# Patient Record
Sex: MALE | Race: WHITE | NOT HISPANIC OR LATINO
[De-identification: names, ages, dates, MRNs, and addresses within clinical notes are randomized per-mention and may not be internally consistent; named-entity substitution may affect disease eponyms.]

---

## 2021-07-12 PROBLEM — Z00.00 ENCOUNTER FOR PREVENTIVE HEALTH EXAMINATION: Status: ACTIVE | Noted: 2021-07-12

## 2021-07-29 ENCOUNTER — APPOINTMENT (OUTPATIENT)
Dept: UROLOGY | Facility: CLINIC | Age: 51
End: 2021-07-29
Payer: MEDICARE

## 2021-07-29 VITALS — HEIGHT: 69 IN | BODY MASS INDEX: 27.85 KG/M2 | TEMPERATURE: 98 F | WEIGHT: 188 LBS

## 2021-07-29 DIAGNOSIS — E27.8 OTHER SPECIFIED DISORDERS OF ADRENAL GLAND: ICD-10-CM

## 2021-07-29 DIAGNOSIS — R68.82 DECREASED LIBIDO: ICD-10-CM

## 2021-07-29 PROCEDURE — 99204 OFFICE O/P NEW MOD 45 MIN: CPT

## 2021-07-29 NOTE — ASSESSMENT
[FreeTextEntry1] : 50 year old with Adrenal Nodule, Stable since 2017. \par PSA is 1.0 which is normal, and stable according to patient prior PSA results.\par No urinary complaints or ED complaints. \par Does complain of decrease libido. \par \par Plan\par -Metanephrine, Aldosterone, and Cortisol ordered to assess Adrenal Nodule\par -Free and Total Testosterone to assess libido\par -Follow up 2 weeks to review.

## 2021-07-29 NOTE — ADDENDUM
[FreeTextEntry1] : Documented by VERNA Gonzalez acting as a scribe for Dr. Edgard Morrison \par \par All medical record entries made by the Scribe were at my, Dr. Morrison direction and\par personally dictated by me.  I have reviewed the chart and agree that the record\par accurately reflects my personal performance of the history, physical exam, procedure and imaging.  \par  \par \par

## 2021-07-29 NOTE — PHYSICAL EXAM
[General Appearance - In No Acute Distress] : no acute distress [] : no respiratory distress [Normal Station and Gait] : the gait and station were normal for the patient's age [No Focal Deficits] : no focal deficits [Oriented To Time, Place, And Person] : oriented to person, place, and time

## 2021-07-29 NOTE — HISTORY OF PRESENT ILLNESS
[FreeTextEntry1] : UVALDO GROSS is a 50 year year old presenting for a evaluation of adrenal nodule and general urology. \par Patient has a past medical history of Hemochromatosis and sleep apnea \par \par Urination symptoms: Patient reports weakening of urinary stream. Denies nocturia, dysuria, and gross hematuria. Patient does not require treatment at this time. \par IPSS: 2 mild symptoms \par \par Erections: Patient report very low libido since April. Patient states that he is attributing this to stress. Patient states that he had no issues getting or maintaining an erection. Patient reports no changes in energy nor weight gain. \par IIEF: 22/25 \par \par Prostate Cancer Screening: PSA 06/2021 was 1.0 ng/mL Patient reports PSA in past has been 1.0 ng/mL. Patient has a family history of prostate cancer. \par \par Old Records:\par MRI of abdomen wo contrast 07/21/2021\par -Leiomyoma measurements, the right adrenal nodule is stable compared to the patients prior MR examination of 07/2018. Signal intesities do not indicate lipid rich adenoma. Given stability this may well represent lipid poor adenoma. (CT in 2017 measured 1.6 x 1.5 x 1.6 cm.) Size was over estimated on recent sonogram. \par \par US abdomen 07/11/2021\par -2.2cm r adrenal nodule. \par \par Primary Care Doctor: Dr. Sonny Garcia \par

## 2021-07-29 NOTE — LETTER BODY
[Dear  ___] : Dear  [unfilled], [Consult Letter:] : I had the pleasure of evaluating your patient, [unfilled]. [Please see my note below.] : Please see my note below. [Sincerely,] : Sincerely, [FreeTextEntry3] : Edgard Morrison MD, FACS\par

## 2021-09-03 ENCOUNTER — APPOINTMENT (OUTPATIENT)
Dept: UROLOGY | Facility: CLINIC | Age: 51
End: 2021-09-03
Payer: MEDICARE

## 2021-09-03 PROCEDURE — 99214 OFFICE O/P EST MOD 30 MIN: CPT | Mod: 95

## 2021-09-06 NOTE — PHYSICAL EXAM
[General Appearance - Well Developed] : well developed [General Appearance - Well Nourished] : well nourished [Heart Rate And Rhythm] : Heart rate and rhythm were normal [] : no respiratory distress [Oriented To Time, Place, And Person] : oriented to person, place, and time

## 2021-09-06 NOTE — HISTORY OF PRESENT ILLNESS
[Home] : at home, [unfilled] , at the time of the visit. [Medical Office: ([facility])___] : at the medical office located in  [Verbal consent obtained from patient] : the patient, [unfilled] [FreeTextEntry1] : UVALDO GROSS is a 50 year year old presenting for a evaluation of adrenal nodule and general urology. \par Patient has a past medical history of Hemochromatosis and sleep apnea \par \par Urination symptoms: Patient reports weakening of urinary stream. Denies nocturia, dysuria, and gross hematuria. Patient does not require treatment at this time. \par IPSS: 2 mild symptoms \par \par Erections: Patient report very low libido since April. Patient states that he is attributing this to stress. Patient states that he had no issues getting or maintaining an erection. Patient reports no changes in energy nor weight gain. \par IIEF: 22/25 \par \par Prostate Cancer Screening: PSA 06/2021 was 1.0 ng/mL Patient reports PSA in past has been 1.0 ng/mL. Patient has a family history of prostate cancer. \par \par Old Records:\par MRI of abdomen wo contrast 07/21/2021\par -Leiomyoma measurements, the right adrenal nodule is stable compared to the patients prior MR examination of 07/2018. Signal intesities do not indicate lipid rich adenoma. Given stability this may well represent lipid poor adenoma. (CT in 2017 measured 1.6 x 1.5 x 1.6 cm.) Size was over estimated on recent sonogram. \par \par US abdomen 07/11/2021\par -2.2cm r adrenal nodule. \par \par serum metanephrines and cortisol wnl\par test - low normal levels

## 2021-09-06 NOTE — ASSESSMENT
[FreeTextEntry1] : 49 yo with adrenal mass- non functional\par discussed low normal T \par explained the role of stress, excess weight, sleep apnea on T levels\par \par weight loss reinforced\par \par -- will repeat imaging ([place])\par - repeat Test in 6 months\par - all questions answered

## 2021-09-06 NOTE — REVIEW OF SYSTEMS
[Chills] : no chills [Fever] : no fever [Chest Pain] : no chest pain [Shortness Of Breath] : no shortness of breath [Abdominal Pain] : no abdominal pain [see HPI] : see HPI [Vomiting] : no vomiting

## 2021-09-17 ENCOUNTER — TRANSCRIPTION ENCOUNTER (OUTPATIENT)
Age: 51
End: 2021-09-17

## 2022-08-03 ENCOUNTER — APPOINTMENT (OUTPATIENT)
Dept: ORTHOPEDIC SURGERY | Facility: CLINIC | Age: 52
End: 2022-08-03

## 2022-08-03 VITALS — BODY MASS INDEX: 27.85 KG/M2 | HEIGHT: 69 IN | WEIGHT: 188 LBS

## 2022-08-03 PROCEDURE — 99213 OFFICE O/P EST LOW 20 MIN: CPT

## 2022-08-03 NOTE — ASSESSMENT
[FreeTextEntry1] : medications refilled monitor right shoulder case he needs an injection he remains totally disabled unable work see\par him in 4 months Follow-up with pain management

## 2022-08-03 NOTE — REASON FOR VISIT
[FreeTextEntry2] : follow up for neck and back pain\par Saw pain management Dr. Naqvi recently did some trigger point injections both sides of his low back still taking Mobic Flexeril gabapentin he did lose 10 lb shoulders are feeling better since last exam particularly the left 1 with the injection which helped MRI of the shoulder had been done  11/22/2021

## 2022-08-03 NOTE — HISTORY OF PRESENT ILLNESS
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 Naqvi are temporary\par had some issues with his left non dominant shoulder lifting a lamp 04/15/2021 now when he raises the arm it hurts he\par has had some problems in the past\par Pain is gotten markedly worse had a cortisone shot helped a little bit did not think therapy helped did get MRI done\par shoulder back in therapy a little discomfort in the right shoulder left shoulder still been stable with the injection right\par shoulder pain is present but does not think it needs a shot today back pain is worse he does have visit with Dr. 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 allergy to penicillin currently on gabapentin tramadol Mobic and Flexeril. he\par was recently told to stop the Mobic due to his GERD he also takes supplements retired in 2014 driving gives him a\par problem does have sleep apnea describes the pain principally going down the right leg he he has had chiropractic\par treatment and physical therapy he does wear a lumbar support he reports spasm his endurance is 15 minutes sitting\par MRIs have been done of the neck and back identifying several herniated discs he has had EMG is of the lower\par extremities in 2018 noting a right S1 and left L5 radiculopathy upper extremity EMGs in 2017 noted right carpal tunnel\par syndrome he had a injection in the cervical spine last September which helped and is still giving him some effective relief\par recently in January had lumbar trigger point injections January 9th which were better than the experience from a lumbar\par epidural in 2016 he is 190 lb when the back acts up laying down is helpful symptoms have been about the same since\par his last visit

## 2022-08-03 NOTE — IMAGING
[de-identified] :  good motion both shoulders minimal impingement\par  cervical spine some spasm neck and shoulders some tightness stiffness limited motion in rotation\par  lumbar stiff tight tight hamstrings negative cough and sneeze effect normal gait

## 2022-08-29 ENCOUNTER — APPOINTMENT (OUTPATIENT)
Dept: UROLOGY | Facility: CLINIC | Age: 52
End: 2022-08-29

## 2022-08-29 VITALS
WEIGHT: 193 LBS | DIASTOLIC BLOOD PRESSURE: 81 MMHG | HEIGHT: 69 IN | SYSTOLIC BLOOD PRESSURE: 119 MMHG | BODY MASS INDEX: 28.58 KG/M2 | HEART RATE: 70 BPM

## 2022-08-29 PROCEDURE — 99214 OFFICE O/P EST MOD 30 MIN: CPT

## 2022-08-31 NOTE — HISTORY OF PRESENT ILLNESS
[FreeTextEntry1] : 51 year year old presenting for follow up - as before he has a stable adrenal nodule and low testosterone\par \par Erections: Patient report very low libido since April. Patient states that he is attributing this to stress. Patient states that he had no issues getting or maintaining an erection. Patient reports no changes in energy nor weight gain. \par IIEF: 22/25 \par \par Prostate Cancer Screening: PSA 06/2021 was 1.0 ng/mL \par Patient reports PSA in past has been 1.0 ng/mL. \par \par Abdomen US 8/10/2022\par \par 1.5 x 1.3 x 1.5 cm corresponding to the nodule seen on MRI\par \par MRI of abdomen wo contrast 07/21/2021\par -Leiomyoma measurements, the right adrenal nodule is stable compared to the patients prior MR examination of 07/2018. Signal intesities do not indicate lipid rich adenoma. Given stability this may well represent lipid poor adenoma. (CT in 2017 measured 1.6 x 1.5 x 1.6 cm.) Size was over estimated on recent sonogram. \par \par US abdomen 07/11/2021\par -2.2cm r adrenal nodule. \par \par Primary Care Doctor: Dr. Sonny Garcia \par  [Urinary Frequency] : urinary frequency [Weak Stream] : weak stream

## 2022-08-31 NOTE — ASSESSMENT
[FreeTextEntry1] : 51 year old with Adrenal Nodule, Stable since 2017. \par PSA is 1.0 which is normal, and stable according to patient prior PSA results.\par No urinary complaints or ED complaints. \par Does complain of decrease libido. \par \par Plan\par -Metanephrine, Aldosterone, and Cortisol from 8/2021 - reviewed \par -Free and Total Testosterone, Estradiol, SHBG, FSH, LH, Prolactin \par -Follow up 6 weeks to review

## 2022-10-27 ENCOUNTER — APPOINTMENT (OUTPATIENT)
Dept: UROLOGY | Facility: CLINIC | Age: 52
End: 2022-10-27

## 2022-10-27 PROCEDURE — 99214 OFFICE O/P EST MOD 30 MIN: CPT

## 2022-10-31 NOTE — HISTORY OF PRESENT ILLNESS
[FreeTextEntry1] : 51 year year old presenting for follow up, here to review urine and blood work\par \par 10/8/2022\par \par UA - no RBCs\par nl - FSH, LH, Prolactin, Estradiol, SHBG\par PSA 1.4 with 29% free\par Test - 334 / 64 (nl/nl)\par \par  - as before he has a stable adrenal nodule and (low / normal) total testosterone but now with normal hypothalamic / gonadal parameters and free Test \par \par he has complained of very low libido since April. Patient states that he is attributing this to stress. Patient states that he had no issues getting or maintaining an erection. Patient reports no changes in energy nor weight gain. \par \par Abdomen US 8/10/2022\par \par 1.5 x 1.3 x 1.5 cm corresponding to the nodule seen on MRI\par \par MRI of abdomen wo contrast 07/21/2021\par -Leiomyoma measurements, the right adrenal nodule is stable compared to the patients prior MR examination of 07/2018. Signal intesities do not indicate lipid rich adenoma. Given stability this may well represent lipid poor adenoma. (CT in 2017 measured 1.6 x 1.5 x 1.6 cm.) Size was over estimated on recent sonogram. \par \par US abdomen 07/11/2021\par -2.2cm r adrenal nodule. \par \par Primary Care Doctor: Dr. Sonny Garcia \par  [Urinary Frequency] : urinary frequency [Weak Stream] : weak stream

## 2022-10-31 NOTE — ASSESSMENT
[FreeTextEntry1] : 51 year old with Adrenal Nodule, Stable since 2017. \par PSA is 1.4 which is normal, and stable according to patient prior PSA results. - but should be repeated yearly to ensure stability\par \par - test levels reviewed\par - PSA reviewed\par - no indication for further intervention\par - all questions answered\par

## 2022-12-01 ENCOUNTER — APPOINTMENT (OUTPATIENT)
Dept: ORTHOPEDIC SURGERY | Facility: CLINIC | Age: 52
End: 2022-12-01

## 2022-12-01 PROCEDURE — 99213 OFFICE O/P EST LOW 20 MIN: CPT

## 2022-12-01 RX ORDER — MELOXICAM 15 MG/1
15 TABLET ORAL
Qty: 30 | Refills: 2 | Status: ACTIVE | COMMUNITY
Start: 2022-12-01 | End: 1900-01-01

## 2022-12-02 NOTE — REASON FOR VISIT
[FreeTextEntry2] : follow up for neck and back pain  shoulders feeling better since his injection pain management and injection left foot recently which helped was told he has gout although does not have an elevated uric acid

## 2022-12-02 NOTE — HISTORY OF PRESENT ILLNESS
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 Naqvi are temporary\par had some issues with his left non dominant shoulder lifting a lamp 04/15/2021 now when he raises the arm it hurts he\par has had some problems in the past\par Pain is gotten markedly worse had a cortisone shot helped a little bit did not think therapy helped did get MRI done\par shoulder back in therapy a little discomfort in the right shoulder left shoulder still been stable with the injection right\par shoulder pain is present but does not think it needs a shot today back pain is worse he does have visit with Dr. 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 allergy to penicillin currently on gabapentin tramadol Mobic and Flexeril. he\par was recently told to stop the Mobic due to his GERD he also takes supplements retired in 2014 driving gives him a\par problem does have sleep apnea describes the pain principally going down the right leg he he has had chiropractic\par treatment and physical therapy he does wear a lumbar support he reports spasm his endurance is 15 minutes sitting\par MRIs have been done of the neck and back identifying several herniated discs he has had EMG is of the lower\par extremities in 2018 noting a right S1 and left L5 radiculopathy upper extremity EMGs in 2017 noted right carpal tunnel\par syndrome he had a injection in the cervical spine last September which helped and is still giving him some effective relief\par recently in January had lumbar trigger point injections January 9th which were better than the experience from a lumbar\par epidural in 2016 he is 190 lb when the back acts up laying down is helpful symptoms have been about the same since\par his last visit

## 2022-12-02 NOTE — IMAGING
[de-identified] :  good motion both shoulders minimal impingement\par  cervical spine some spasm neck and shoulders some tightness stiffness limited motion in rotation\par  lumbar stiff tight tight hamstrings negative cough and sneeze effect normal gait

## 2022-12-02 NOTE — ASSESSMENT
[FreeTextEntry1] :  orthopedically stable follow-up with pain management I saw no reason to repeat injections in his shoulders is meloxicam was refilled had enough Flexeril  he remains totally disabled unable to work I will see him back in 4 months

## 2023-02-27 ENCOUNTER — EMERGENCY (EMERGENCY)
Facility: HOSPITAL | Age: 53
LOS: 0 days | Discharge: HOME | End: 2023-02-27
Attending: EMERGENCY MEDICINE
Payer: MEDICARE

## 2023-02-27 VITALS
TEMPERATURE: 99 F | DIASTOLIC BLOOD PRESSURE: 71 MMHG | HEART RATE: 92 BPM | OXYGEN SATURATION: 97 % | RESPIRATION RATE: 18 BRPM | SYSTOLIC BLOOD PRESSURE: 117 MMHG

## 2023-02-27 VITALS
HEART RATE: 88 BPM | HEIGHT: 64 IN | RESPIRATION RATE: 18 BRPM | TEMPERATURE: 99 F | OXYGEN SATURATION: 99 % | SYSTOLIC BLOOD PRESSURE: 108 MMHG | DIASTOLIC BLOOD PRESSURE: 71 MMHG | WEIGHT: 190.04 LBS

## 2023-02-27 DIAGNOSIS — Z88.0 ALLERGY STATUS TO PENICILLIN: ICD-10-CM

## 2023-02-27 DIAGNOSIS — U07.1 COVID-19: ICD-10-CM

## 2023-02-27 DIAGNOSIS — R53.81 OTHER MALAISE: ICD-10-CM

## 2023-02-27 DIAGNOSIS — R11.2 NAUSEA WITH VOMITING, UNSPECIFIED: ICD-10-CM

## 2023-02-27 DIAGNOSIS — R55 SYNCOPE AND COLLAPSE: ICD-10-CM

## 2023-02-27 DIAGNOSIS — R53.1 WEAKNESS: ICD-10-CM

## 2023-02-27 DIAGNOSIS — R50.9 FEVER, UNSPECIFIED: ICD-10-CM

## 2023-02-27 DIAGNOSIS — J45.909 UNSPECIFIED ASTHMA, UNCOMPLICATED: ICD-10-CM

## 2023-02-27 LAB
ALBUMIN SERPL ELPH-MCNC: 4.2 G/DL — SIGNIFICANT CHANGE UP (ref 3.5–5.2)
ALP SERPL-CCNC: 47 U/L — SIGNIFICANT CHANGE UP (ref 30–115)
ALT FLD-CCNC: 24 U/L — SIGNIFICANT CHANGE UP (ref 0–41)
ANION GAP SERPL CALC-SCNC: 12 MMOL/L — SIGNIFICANT CHANGE UP (ref 7–14)
AST SERPL-CCNC: 30 U/L — SIGNIFICANT CHANGE UP (ref 0–41)
BASOPHILS # BLD AUTO: 0.02 K/UL — SIGNIFICANT CHANGE UP (ref 0–0.2)
BASOPHILS NFR BLD AUTO: 0.1 % — SIGNIFICANT CHANGE UP (ref 0–1)
BILIRUB SERPL-MCNC: 0.7 MG/DL — SIGNIFICANT CHANGE UP (ref 0.2–1.2)
BUN SERPL-MCNC: 11 MG/DL — SIGNIFICANT CHANGE UP (ref 10–20)
CALCIUM SERPL-MCNC: 8.9 MG/DL — SIGNIFICANT CHANGE UP (ref 8.4–10.5)
CHLORIDE SERPL-SCNC: 104 MMOL/L — SIGNIFICANT CHANGE UP (ref 98–110)
CO2 SERPL-SCNC: 25 MMOL/L — SIGNIFICANT CHANGE UP (ref 17–32)
CREAT SERPL-MCNC: 1.2 MG/DL — SIGNIFICANT CHANGE UP (ref 0.7–1.5)
EGFR: 72 ML/MIN/1.73M2 — SIGNIFICANT CHANGE UP
EOSINOPHIL # BLD AUTO: 0 K/UL — SIGNIFICANT CHANGE UP (ref 0–0.7)
EOSINOPHIL NFR BLD AUTO: 0 % — SIGNIFICANT CHANGE UP (ref 0–8)
FLUAV AG NPH QL: SIGNIFICANT CHANGE UP
FLUBV AG NPH QL: SIGNIFICANT CHANGE UP
GLUCOSE SERPL-MCNC: 139 MG/DL — HIGH (ref 70–99)
HCT VFR BLD CALC: 43.2 % — SIGNIFICANT CHANGE UP (ref 42–52)
HGB BLD-MCNC: 14.5 G/DL — SIGNIFICANT CHANGE UP (ref 14–18)
IMM GRANULOCYTES NFR BLD AUTO: 0.5 % — HIGH (ref 0.1–0.3)
LYMPHOCYTES # BLD AUTO: 0.96 K/UL — LOW (ref 1.2–3.4)
LYMPHOCYTES # BLD AUTO: 6.2 % — LOW (ref 20.5–51.1)
MCHC RBC-ENTMCNC: 30.5 PG — SIGNIFICANT CHANGE UP (ref 27–31)
MCHC RBC-ENTMCNC: 33.6 G/DL — SIGNIFICANT CHANGE UP (ref 32–37)
MCV RBC AUTO: 90.9 FL — SIGNIFICANT CHANGE UP (ref 80–94)
MONOCYTES # BLD AUTO: 1.36 K/UL — HIGH (ref 0.1–0.6)
MONOCYTES NFR BLD AUTO: 8.8 % — SIGNIFICANT CHANGE UP (ref 1.7–9.3)
NEUTROPHILS # BLD AUTO: 12.96 K/UL — HIGH (ref 1.4–6.5)
NEUTROPHILS NFR BLD AUTO: 84.4 % — HIGH (ref 42.2–75.2)
NRBC # BLD: 0 /100 WBCS — SIGNIFICANT CHANGE UP (ref 0–0)
PLATELET # BLD AUTO: 312 K/UL — SIGNIFICANT CHANGE UP (ref 130–400)
POTASSIUM SERPL-MCNC: 4.2 MMOL/L — SIGNIFICANT CHANGE UP (ref 3.5–5)
POTASSIUM SERPL-SCNC: 4.2 MMOL/L — SIGNIFICANT CHANGE UP (ref 3.5–5)
PROT SERPL-MCNC: 6.8 G/DL — SIGNIFICANT CHANGE UP (ref 6–8)
RBC # BLD: 4.75 M/UL — SIGNIFICANT CHANGE UP (ref 4.7–6.1)
RBC # FLD: 13.6 % — SIGNIFICANT CHANGE UP (ref 11.5–14.5)
RSV RNA NPH QL NAA+NON-PROBE: SIGNIFICANT CHANGE UP
SARS-COV-2 RNA SPEC QL NAA+PROBE: DETECTED
SODIUM SERPL-SCNC: 141 MMOL/L — SIGNIFICANT CHANGE UP (ref 135–146)
WBC # BLD: 15.38 K/UL — HIGH (ref 4.8–10.8)
WBC # FLD AUTO: 15.38 K/UL — HIGH (ref 4.8–10.8)

## 2023-02-27 PROCEDURE — 71045 X-RAY EXAM CHEST 1 VIEW: CPT

## 2023-02-27 PROCEDURE — 71045 X-RAY EXAM CHEST 1 VIEW: CPT | Mod: 26

## 2023-02-27 PROCEDURE — 0241U: CPT

## 2023-02-27 PROCEDURE — 99284 EMERGENCY DEPT VISIT MOD MDM: CPT | Mod: CS

## 2023-02-27 PROCEDURE — 80053 COMPREHEN METABOLIC PANEL: CPT

## 2023-02-27 PROCEDURE — 93005 ELECTROCARDIOGRAM TRACING: CPT

## 2023-02-27 PROCEDURE — 93010 ELECTROCARDIOGRAM REPORT: CPT

## 2023-02-27 PROCEDURE — 94640 AIRWAY INHALATION TREATMENT: CPT

## 2023-02-27 PROCEDURE — 99285 EMERGENCY DEPT VISIT HI MDM: CPT | Mod: 25

## 2023-02-27 PROCEDURE — 36415 COLL VENOUS BLD VENIPUNCTURE: CPT

## 2023-02-27 PROCEDURE — 85025 COMPLETE CBC W/AUTO DIFF WBC: CPT

## 2023-02-27 RX ORDER — IPRATROPIUM/ALBUTEROL SULFATE 18-103MCG
3 AEROSOL WITH ADAPTER (GRAM) INHALATION ONCE
Refills: 0 | Status: COMPLETED | OUTPATIENT
Start: 2023-02-27 | End: 2023-02-27

## 2023-02-27 RX ORDER — SODIUM CHLORIDE 9 MG/ML
1000 INJECTION INTRAMUSCULAR; INTRAVENOUS; SUBCUTANEOUS ONCE
Refills: 0 | Status: COMPLETED | OUTPATIENT
Start: 2023-02-27 | End: 2023-02-27

## 2023-02-27 RX ADMIN — SODIUM CHLORIDE 2000 MILLILITER(S): 9 INJECTION INTRAMUSCULAR; INTRAVENOUS; SUBCUTANEOUS at 11:54

## 2023-02-27 RX ADMIN — Medication 3 MILLILITER(S): at 12:48

## 2023-02-27 NOTE — ED PROVIDER NOTE - ATTENDING APP SHARED VISIT CONTRIBUTION OF CARE
52-year-old male past medical history of asthma presents with presyncopal episode.  Patient states for last few days he has been having viral illness.  Reports fevers, sore throat, cough.  Fever increased to 102 so went to urgent care center and was diagnosed with COVID.  Patient reports that after finding out diagnosis he started to feel lightheaded and felt like he might pass out.  Denies any syncopal episode.  Also reports an episode of nausea and vomiting.  No chest pain palpitations or shortness of breath.  No similar episodes in the past.  Urgent care also reported that his oxygen saturation was low around 95%.  Patient is normal saturation currently.  Received 300 cc of fluids by EMS and 8 mg of Zofran by EMS.    CONSTITUTIONAL: Well-developed; well-nourished; in no acute distress.   SKIN: warm, dry  HEAD: Normocephalic; atraumatic.  EYES: PERRL, EOMI, no conjunctival erythema  ENT: No nasal discharge; airway clear.  NECK: Supple; non tender.  CARD: S1, S2 normal;  Regular rate and rhythm.   RESP: No wheezes, rales or rhonchi.  ABD: soft non tender, non distended, no rebound or guarding  EXT: Normal ROM.  5/5 strength in all 4 extremities   LYMPH: No acute cervical adenopathy.  NEURO: Alert, oriented, grossly unremarkable. neurovascularly intact  PSYCH: Cooperative, appropriate.

## 2023-02-27 NOTE — ED PROVIDER NOTE - PROGRESS NOTE DETAILS
Observed patient ambulating while on pulse oximeter, SpO2 was 97% while ambulating, patient states he is feeling well, normal gait. supervised care of this patient      patient sat is 99 at rest and 97 ambulating . will dc

## 2023-02-27 NOTE — ED PROVIDER NOTE - PATIENT PORTAL LINK FT
You can access the FollowMyHealth Patient Portal offered by [facility] by registering at the following website: [email or web address]. By joining [employer]’s FollowMyHealth portal, you will also be able to view your health information using other applications (apps) compatible with our system.

## 2023-02-27 NOTE — ED PROVIDER NOTE - NS ED ROS FT
Constitutional: (+) fever, (-) chills  Eyes: (-) visual changes  ENT: (-) nasal congestion  Cardiovascular: (-) chest pain, (-) syncope  Respiratory: (+) cough, (-) shortness of breath, (-) dyspnea,   Gastrointestinal: (+) vomiting, (-) diarrhea, (+)nausea,  Musculoskeletal: (-) neck pain, (-) back pain, (-) joint pain,  Integumentary: (-) rash, (-) edema, (-) bruises  Neurological: (-) headache, (-) loc, (-) dizziness, (-) tingling, (-)numbness,  Peripheral Vascular: (-) leg swelling  :  (-)dysuria,  (-) hematuria  Allergic/Immunologic: (-) pruritus

## 2023-02-27 NOTE — ED PROVIDER NOTE - OBJECTIVE STATEMENT
52 year-old male with past medical history asthma presents with complaint of weakness/malaise. States he was feeling unwell this past weekend, went to [facility] today and 52 year-old male with past medical history asthma presents with complaint of weakness/malaise. States he was feeling unwell this past weekend with low grade fever, went to [facility] today and was told he is COVID-positive. Patient states he has never had COVID before, felt anxious at hearing the result, felt clammy and sweaty and felt like he might lose consciousness.  States he felt nauseous and had episode of nonbloody emesis.  EMTs were called, gave patient 300 cc of fluid, 8 mg Zofran, which patient states made him feel better.  Denies trauma, LOC, head trauma, visual changes.  Denies headache, chest pain, shortness of breath, abdominal pain, diarrhea, urinary sx.

## 2023-02-27 NOTE — ED PROVIDER NOTE - CLINICAL SUMMARY MEDICAL DECISION MAKING FREE TEXT BOX
Patient tested positive for COVID at urgent care center.  Found to have saturation 95 at urgent care and episode of presyncope.  So sent to the ED for further evaluation.  Labs EKG chest x-ray done in the ED.  Saturation 98% and higher.  Well-appearing no acute findings.  Discharged with PMD follow-up and return precautions.

## 2023-02-27 NOTE — ED PROVIDER NOTE - NSFOLLOWUPINSTRUCTIONS_ED_ALL_ED_FT
Follow-up with your PCP.    If you are sick with COVID-19 or suspect you are infected with the virus that causes COVID-19, follow the steps below to help prevent the disease from spreading to people in your home and community.   https://www.cdc.gov/coronavirus/2019-ncov/downloads/sick-with-2019-nCoV-fact-sheet.pdf    Stay home except to get medical care   You should restrict activities outside your home, except for getting medical care. Do not go to work, school, or public areas. Avoid using public transportation, ride-sharing, or taxis.   Separate yourself from other people and animals in your home   People: As much as possible, you should stay in a specific room and away from other people in your home. Also, you should use a separate bathroom, if available.   Animals: Do not handle pets or other animals while sick. See COVID-19 and Animals for more information.   Call ahead before visiting your doctor   If you have a medical appointment, call the healthcare provider and tell them that you have or may have COVID-19. This will help the healthcare provider’s office take steps to keep other people from getting infected or exposed.   Wear a facemask   You should wear a facemask when you are around other people (e.g., sharing a room or vehicle) or pets and before you enter a healthcare provider’s office. If you are not able to wear a facemask (for example, because it causes trouble breathing), then people who live with you should not stay in the same room with you, or they should wear a facemask if they enter your room.   Cover your coughs and sneezes   Cover your mouth and nose with a tissue when you cough or sneeze. Throw used tissues in a lined trash can; immediately wash your hands with soap and water for at least 20 seconds or clean your hands with an alcohol-based hand  that contains at least 60% alcohol covering all surfaces of your hands and rubbing them together until they feel dry. Soap and water should be used preferentially if hands are visibly dirty.   Avoid sharing personal household items   You should not share dishes, drinking glasses, cups, eating utensils, towels, or bedding with other people or pets in your home. After using these items, they should be washed thoroughly with soap and water.   Clean your hands often   Wash your hands often with soap and water for at least 20 seconds. If soap and water are not available, clean your hands with an alcohol-based hand  that contains at least 60% alcohol, covering all surfaces of your hands and rubbing them together until they feel dry. Soap and water should be used preferentially if hands are visibly dirty. Avoid touching your eyes, nose, and mouth with unwashed hands.   Clean all “high-touch” surfaces every day   High touch surfaces include counters, tabletops, doorknobs, bathroom fixtures, toilets, phones, keyboards, tablets, and bedside tables. Also, clean any surfaces that may have blood, stool, or body fluids on them. Use a household cleaning spray or wipe, according to the label instructions. Labels contain instructions for safe and effective use of the cleaning product including precautions you should take when applying the product, such as wearing gloves and making sure you have good ventilation during use of the product.   Monitor your symptoms   Seek prompt medical attention if your illness is worsening (e.g., difficulty breathing). Before seeking care, call your healthcare provider and tell them that you have, or are being evaluated for, COVID-19. Put on a facemask before you enter the facility. These steps will help the healthcare provider’s office to keep other people in the office or waiting room from getting infected or exposed. Ask your healthcare provider to call the local or state health department. Persons who are placed under active monitoring or facilitated self-monitoring should follow instructions provided by their local health department or occupational health professionals, as appropriate. When working with your local health department check their available hours. If you have a medical emergency and need to call 911, notify the dispatch personnel that you have, or are being evaluated for COVID-19. If possible, put on a facemask before emergency medical services arrive.   Discontinuing home isolation   Patients with confirmed COVID-19 should remain under home isolation precautions until the risk of secondary transmission to others is thought to be low. The decision to discontinue home isolation precautions should be made on a case-by-case basis, in consultation with healthcare providers and state and local health departments.  For more information: www.cdc.gov/COVID19

## 2023-02-27 NOTE — ED PROVIDER NOTE - PHYSICAL EXAMINATION
Physical Exam    Vital Signs: I have reviewed the initial vital signs.  Constitutional: appears stated age, no acute distress  Eyes: Conjunctiva pink, Sclera clear, PERRLA, EOMI.  ENT: OP is clear without exudates, normal dentition, normal gingival, tongue without swelling  Cardiovascular: S1 and S2, regular rate, regular rhythm, well-perfused extremities, radial pulses equal and 2+, pedal pulses 2+ and equal  Respiratory: unlabored respiratory effort, mild wheeze at right lung base.  Gastrointestinal: soft, non-tender abdomen, no pulsatile mass, normal bowl sounds  Musculoskeletal: supple neck, no lower extremity edema, no midline tenderness  Integumentary: warm, dry, no rash  Neurologic: Awake, alert, oriented. CN II-XII intact, 5/5 strength at upper and lower extremities, no pronator drift, intact finger to nose, steady gait and tandem gait, clear speech  Psychiatric: appropriate mood, appropriate affect

## 2023-03-09 ENCOUNTER — APPOINTMENT (OUTPATIENT)
Dept: ORTHOPEDIC SURGERY | Facility: CLINIC | Age: 53
End: 2023-03-09

## 2023-03-30 ENCOUNTER — APPOINTMENT (OUTPATIENT)
Dept: ORTHOPEDIC SURGERY | Facility: CLINIC | Age: 53
End: 2023-03-30
Payer: MEDICARE

## 2023-03-30 PROBLEM — J45.909 UNSPECIFIED ASTHMA, UNCOMPLICATED: Chronic | Status: ACTIVE | Noted: 2023-02-27

## 2023-03-30 PROCEDURE — 99213 OFFICE O/P EST LOW 20 MIN: CPT

## 2023-03-30 NOTE — HISTORY OF PRESENT ILLNESS
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 Naqvi are temporary\par had some issues with his left non dominant shoulder lifting a lamp 04/15/2021 now when he raises the arm it hurts he\par has had some problems in the past\par Pain is gotten markedly worse had a cortisone shot helped a little bit did not think therapy helped did get MRI done\par shoulder back in therapy a little discomfort in the right shoulder left shoulder still been stable with the injection right\par shoulder pain is present but does not think it needs a shot today back pain is worse he does have visit with Dr. 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 allergy to penicillin currently on gabapentin tramadol Mobic and Flexeril. he\par was recently told to stop the Mobic due to his GERD he also takes supplements retired in 2014 driving gives him a\par problem does have sleep apnea describes the pain principally going down the right leg he he has had chiropractic\par treatment and physical therapy he does wear a lumbar support he reports spasm his endurance is 15 minutes sitting\par MRIs have been done of the neck and back identifying several herniated discs he has had EMG is of the lower\par extremities in 2018 noting a right S1 and left L5 radiculopathy upper extremity EMGs in 2017 noted right carpal tunnel\par syndrome he had a injection in the cervical spine last September which helped and is still giving him some effective relief\par recently in January had lumbar trigger point injections January 9th which were better than the experience from a lumbar\par epidural in 2016 he is 190 lb when the back acts up laying down is helpful symptoms have been about the same since\par his last visit

## 2023-03-30 NOTE — IMAGING
[de-identified] :  good motion both shoulders minimal impingement\par  cervical spine some spasm neck and shoulders some tightness stiffness limited motion in rotation\par  lumbar stiff tight tight hamstrings negative cough and sneeze effect normal gait

## 2023-03-30 NOTE — ASSESSMENT
[FreeTextEntry1] :  orthopedically stable follow-up with pain management I saw no reason to repeat injections in his shoulders  has enough Mobic Flexeril  refilled he remains totally disabled unable to work I will see him back in 4 months\par  sent for a uric acid level

## 2023-06-29 ENCOUNTER — APPOINTMENT (OUTPATIENT)
Dept: ORTHOPEDIC SURGERY | Facility: CLINIC | Age: 53
End: 2023-06-29
Payer: MEDICARE

## 2023-06-29 PROCEDURE — 99213 OFFICE O/P EST LOW 20 MIN: CPT

## 2023-06-29 NOTE — ASSESSMENT
[FreeTextEntry1] :  orthopedically stable follow-up with pain management I saw no reason to repeat injections in his shoulders  has enough Mobic Flexeril  refilled he remains totally disabled unable to work I will see him back in 4 months\par

## 2023-06-29 NOTE — IMAGING
[de-identified] :  good motion both shoulders minimal impingement\par  cervical spine some spasm neck and shoulders some tightness stiffness limited motion in rotation\par  lumbar stiff tight tight hamstrings negative cough and sneeze effect normal gait

## 2023-06-29 NOTE — HISTORY OF PRESENT ILLNESS
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 Naqvi are temporary\par had some issues with his left non dominant shoulder lifting a lamp 04/15/2021 now when he raises the arm it hurts he\par has had some problems in the past\par Pain is gotten markedly worse had a cortisone shot helped a little bit did not think therapy helped did get MRI done\par shoulder back in therapy a little discomfort in the right shoulder left shoulder still been stable with the injection right\par shoulder pain is present but does not think it needs a shot today back pain is worse he does have visit with Dr. 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 allergy to penicillin currently on gabapentin tramadol Mobic and Flexeril. he\par was recently told to stop the Mobic due to his GERD he also takes supplements retired in 2014 driving gives him a\par problem does have sleep apnea describes the pain principally going down the right leg he he has had chiropractic\par treatment and physical therapy he does wear a lumbar support he reports spasm his endurance is 15 minutes sitting\par MRIs have been done of the neck and back identifying several herniated discs he has had EMG is of the lower\par extremities in 2018 noting a right S1 and left L5 radiculopathy upper extremity EMGs in 2017 noted right carpal tunnel\par syndrome he had a injection in the cervical spine last September which helped and is still giving him some effective relief\par recently in January had lumbar trigger point injections January 9th which were better than the experience from a lumbar\par epidural in 2016 he is 190 lb when the back acts up laying down is helpful symptoms have been about the same since\par his last visit

## 2023-06-29 NOTE — REASON FOR VISIT
[FreeTextEntry2] : back and neck pain  Lost 5 lb using Mobic regularly flexor on occasion back is worse when he stands less pain when he is laying down uric acid been up on allopurinol pain is improved in his left great toe

## 2023-11-06 ENCOUNTER — APPOINTMENT (OUTPATIENT)
Dept: ORTHOPEDIC SURGERY | Facility: CLINIC | Age: 53
End: 2023-11-06
Payer: MEDICARE

## 2023-11-06 DIAGNOSIS — M54.12 RADICULOPATHY, CERVICAL REGION: ICD-10-CM

## 2023-11-06 PROCEDURE — 99213 OFFICE O/P EST LOW 20 MIN: CPT

## 2024-02-12 ENCOUNTER — RX RENEWAL (OUTPATIENT)
Age: 54
End: 2024-02-12

## 2024-03-04 ENCOUNTER — APPOINTMENT (OUTPATIENT)
Dept: ORTHOPEDIC SURGERY | Facility: CLINIC | Age: 54
End: 2024-03-04
Payer: MEDICARE

## 2024-03-04 DIAGNOSIS — M75.41 IMPINGEMENT SYNDROME OF RIGHT SHOULDER: ICD-10-CM

## 2024-03-04 DIAGNOSIS — M54.16 RADICULOPATHY, LUMBAR REGION: ICD-10-CM

## 2024-03-04 DIAGNOSIS — M51.9 UNSPECIFIED THORACIC, THORACOLUMBAR AND LUMBOSACRAL INTERVERTEBRAL DISC DISORDER: ICD-10-CM

## 2024-03-04 DIAGNOSIS — M75.42 IMPINGEMENT SYNDROME OF LEFT SHOULDER: ICD-10-CM

## 2024-03-04 DIAGNOSIS — M50.90 CERVICAL DISC DISORDER, UNSPECIFIED, UNSPECIFIED CERVICAL REGION: ICD-10-CM

## 2024-03-04 PROCEDURE — 99213 OFFICE O/P EST LOW 20 MIN: CPT

## 2024-03-04 RX ORDER — CYCLOBENZAPRINE HYDROCHLORIDE 10 MG/1
10 TABLET, FILM COATED ORAL 3 TIMES DAILY
Qty: 90 | Refills: 1 | Status: ACTIVE | COMMUNITY
Start: 2023-03-30 | End: 1900-01-01

## 2024-03-04 NOTE — IMAGING
[de-identified] :  good motion both shoulders minimal impingement\par   cervical spine some spasm neck and shoulders some tightness stiffness limited motion in rotation\par   lumbar stiff tight tight hamstrings negative cough and sneeze effect normal gait

## 2024-03-04 NOTE — REASON FOR VISIT
[FreeTextEntry2] : Patient is coming in for a follow up on lumbar spine.  Still seeing Dr. Naqvi weight is stable 190 endurance sitting or standing 10 to 20 minutes shoulder injection has helped on the left does not feel he needs it again had a flareup of gout which resolved with some prednisone

## 2024-03-04 NOTE — ASSESSMENT
[FreeTextEntry1] : orthopedically stable continue follow-up with pain management I saw no reason to repeat any injections in his shoulders has enough Mobic     Flexeril was refilled he remains totally disabled unable to work I will see him back in 4 months .

## 2024-03-04 NOTE — HISTORY OF PRESENT ILLNESS
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 Naqvi are temporary\par  had some issues with his left non dominant shoulder lifting a lamp 04/15/2021 now when he raises the arm it hurts he\par  has had some problems in the past\par  Pain is gotten markedly worse had a cortisone shot helped a little bit did not think therapy helped did get MRI done\par  shoulder back in therapy a little discomfort in the right shoulder left shoulder still been stable with the injection right\par  shoulder pain is present but does not think it needs a shot today back pain is worse he does have visit with Dr. 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 allergy to penicillin currently on gabapentin tramadol Mobic and Flexeril. 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\par  problem does have sleep apnea describes the pain principally going down the right leg he he has had chiropractic\par  treatment and physical therapy he does wear a lumbar support he reports spasm his endurance is 15 minutes sitting\par  MRIs have been done of the neck and back identifying several herniated discs he has had EMG is of the lower\par  extremities in 2018 noting a right S1 and left L5 radiculopathy upper extremity EMGs in 2017 noted right carpal tunnel\par  syndrome he had a injection in the cervical spine last September which helped and is still giving him some effective relief\par  recently in January had lumbar trigger point injections January 9th which were better than the experience from a lumbar\par  epidural in 2016 he is 190 lb when the back acts up laying down is helpful symptoms have been about the same since\par  his last visit

## 2024-05-02 ENCOUNTER — APPOINTMENT (OUTPATIENT)
Dept: UROLOGY | Facility: CLINIC | Age: 54
End: 2024-05-02
Payer: MEDICARE

## 2024-05-02 VITALS
HEIGHT: 69 IN | TEMPERATURE: 98.4 F | BODY MASS INDEX: 28.58 KG/M2 | WEIGHT: 193 LBS | RESPIRATION RATE: 16 BRPM | SYSTOLIC BLOOD PRESSURE: 152 MMHG | OXYGEN SATURATION: 96 % | DIASTOLIC BLOOD PRESSURE: 93 MMHG | HEART RATE: 74 BPM

## 2024-05-02 DIAGNOSIS — M54.9 DORSALGIA, UNSPECIFIED: ICD-10-CM

## 2024-05-02 PROCEDURE — 99214 OFFICE O/P EST MOD 30 MIN: CPT

## 2024-05-02 RX ORDER — GABAPENTIN 400 MG
400 TABLET ORAL
Refills: 0 | Status: ACTIVE | COMMUNITY

## 2024-05-02 RX ORDER — MELOXICAM 15 MG/1
15 TABLET ORAL
Qty: 90 | Refills: 3 | Status: DISCONTINUED | COMMUNITY
Start: 2023-02-23 | End: 2024-05-02

## 2024-05-07 NOTE — HISTORY OF PRESENT ILLNESS
[FreeTextEntry1] : 53 year year old presenting for follow up - he is here for a standard health maintenance exam - no recent PSA was available   he has complained of very low libido since April.  Patient states that he is attributing this to stress.  Patient states that he had no issues getting or maintaining an erection.  Patient reports no changes in energy nor weight gain.   04/01/2023 Cr 1.09 ng/dl Uric Acid - 8.6   10/8/2022  UA - no RBCs nl - FSH, LH, Prolactin, Estradiol, SHBG PSA 1.4 with 29% free Test - 334 / 64 (nl/nl)  Abdomen US 8/10/2022  1.5 x 1.3 x 1.5 cm corresponding to the nodule seen on MRI  MRI of abdomen wo contrast 07/21/2021 -Leiomyoma measurements, the right adrenal nodule is stable compared to the patients prior MR examination of 07/2018. Signal intesities do not indicate lipid rich adenoma. Given stability this may well represent lipid poor adenoma. (CT in 2017 measured 1.6 x 1.5 x 1.6 cm.) Size was over estimated on recent sonogram.   US abdomen 07/11/2021 -2.2cm r adrenal nodule.     [Urinary Frequency] : urinary frequency [Weak Stream] : weak stream

## 2024-05-07 NOTE — ASSESSMENT
[FreeTextEntry1] : 53 year year old presenting for follow up - he is here for a standard health maintenance exam - no recent PSA was available   he has complained of very low libido since April.  Patient states that he is attributing this to stress.  Patient states that he had no issues getting or maintaining an erection.  Patient reports no changes in energy nor weight gain.   04/01/2023 Cr 1.09 ng/dl Uric Acid - 8.6   10/8/2022  UA - no RBCs nl - FSH, LH, Prolactin, Estradiol, SHBG PSA 1.4 with 29% free Test - 334 / 64 (nl/nl)  Abdomen US 8/10/2022  1.5 x 1.3 x 1.5 cm corresponding to the nodule seen on MRI  MRI of abdomen wo contrast 07/21/2021 -Leiomyoma measurements, the right adrenal nodule is stable compared to the patients prior MR examination of 07/2018. Signal intesities do not indicate lipid rich adenoma. Given stability this may well represent lipid poor adenoma. (CT in 2017 measured 1.6 x 1.5 x 1.6 cm.) Size was over estimated on recent sonogram.   US abdomen 07/11/2021 -2.2cm r adrenal nodule.   Plan 54 yo with low libido - history of right adrenal leiomyoma c/o difficulty maintaining an erection discussed the need for renal imaging (history of UA elevation) - renal and bladder US - PSA - cialis 5 mg po prn (full R+B explained regarding their use) - f/u to review in 6 weeks

## 2024-05-07 NOTE — LETTER BODY
[Dear  ___] : Dear  [unfilled], [Consult Letter:] : I had the pleasure of evaluating your patient, [unfilled]. [Please see my note below.] : Please see my note below. [Sincerely,] : Sincerely, [FreeTextEntry3] : Edgard Morrison MD, FACS

## 2024-06-10 ENCOUNTER — NON-APPOINTMENT (OUTPATIENT)
Age: 54
End: 2024-06-10

## 2024-06-27 ENCOUNTER — APPOINTMENT (OUTPATIENT)
Dept: UROLOGY | Facility: CLINIC | Age: 54
End: 2024-06-27
Payer: MEDICARE

## 2024-06-27 DIAGNOSIS — N52.9 MALE ERECTILE DYSFUNCTION, UNSPECIFIED: ICD-10-CM

## 2024-06-27 DIAGNOSIS — N40.1 BENIGN PROSTATIC HYPERPLASIA WITH LOWER URINARY TRACT SYMPMS: ICD-10-CM

## 2024-06-27 DIAGNOSIS — N13.8 BENIGN PROSTATIC HYPERPLASIA WITH LOWER URINARY TRACT SYMPMS: ICD-10-CM

## 2024-06-27 DIAGNOSIS — Z12.5 ENCOUNTER FOR SCREENING FOR MALIGNANT NEOPLASM OF PROSTATE: ICD-10-CM

## 2024-06-27 PROCEDURE — 99214 OFFICE O/P EST MOD 30 MIN: CPT

## 2024-06-28 ENCOUNTER — TRANSCRIPTION ENCOUNTER (OUTPATIENT)
Age: 54
End: 2024-06-28

## 2024-06-29 PROBLEM — Z12.5 SCREENING PSA (PROSTATE SPECIFIC ANTIGEN): Status: ACTIVE | Noted: 2024-05-07

## 2024-06-29 PROBLEM — N52.9 ORGANIC IMPOTENCE: Status: ACTIVE | Noted: 2024-05-07

## 2024-07-08 ENCOUNTER — APPOINTMENT (OUTPATIENT)
Dept: ORTHOPEDIC SURGERY | Facility: CLINIC | Age: 54
End: 2024-07-08
Payer: MEDICARE

## 2024-07-08 DIAGNOSIS — M75.41 IMPINGEMENT SYNDROME OF RIGHT SHOULDER: ICD-10-CM

## 2024-07-08 DIAGNOSIS — M51.9 UNSPECIFIED THORACIC, THORACOLUMBAR AND LUMBOSACRAL INTERVERTEBRAL DISC DISORDER: ICD-10-CM

## 2024-07-08 DIAGNOSIS — M54.16 RADICULOPATHY, LUMBAR REGION: ICD-10-CM

## 2024-07-08 DIAGNOSIS — M50.90 CERVICAL DISC DISORDER, UNSPECIFIED, UNSPECIFIED CERVICAL REGION: ICD-10-CM

## 2024-07-08 DIAGNOSIS — M75.42 IMPINGEMENT SYNDROME OF LEFT SHOULDER: ICD-10-CM

## 2024-07-08 DIAGNOSIS — M54.12 RADICULOPATHY, CERVICAL REGION: ICD-10-CM

## 2024-07-08 PROCEDURE — G2211 COMPLEX E/M VISIT ADD ON: CPT

## 2024-07-08 PROCEDURE — 99213 OFFICE O/P EST LOW 20 MIN: CPT

## 2024-07-08 RX ORDER — MELOXICAM 15 MG/1
15 TABLET ORAL
Qty: 90 | Refills: 2 | Status: ACTIVE | COMMUNITY
Start: 2024-07-08 | End: 1900-01-01

## 2024-07-31 ENCOUNTER — APPOINTMENT (OUTPATIENT)
Dept: PEDIATRIC ALLERGY IMMUNOLOGY | Facility: CLINIC | Age: 54
End: 2024-07-31
Payer: MEDICARE

## 2024-07-31 VITALS
BODY MASS INDEX: 27.25 KG/M2 | WEIGHT: 184 LBS | SYSTOLIC BLOOD PRESSURE: 125 MMHG | HEIGHT: 69 IN | DIASTOLIC BLOOD PRESSURE: 80 MMHG

## 2024-07-31 DIAGNOSIS — L50.8 OTHER URTICARIA: ICD-10-CM

## 2024-07-31 DIAGNOSIS — Z82.5 FAMILY HISTORY OF ASTHMA AND OTHER CHRONIC LOWER RESPIRATORY DISEASES: ICD-10-CM

## 2024-07-31 PROCEDURE — 99204 OFFICE O/P NEW MOD 45 MIN: CPT

## 2024-07-31 RX ORDER — MONTELUKAST 10 MG/1
10 TABLET, FILM COATED ORAL
Qty: 30 | Refills: 1 | Status: ACTIVE | COMMUNITY
Start: 2024-07-31 | End: 1900-01-01

## 2024-07-31 RX ORDER — DOXEPIN HYDROCHLORIDE 10 MG/1
10 CAPSULE ORAL
Refills: 0 | Status: ACTIVE | COMMUNITY

## 2024-07-31 NOTE — HISTORY OF PRESENT ILLNESS
[Allergic Rhinitis] : allergic rhinitis [Eczematous rashes] : eczematous rashes [Venom Reactions] : venom reactions [Food Allergies] : food allergies [Drug Allergies] : drug allergies [de-identified] : UVALDO GROSS  is a 53 year old male who has been experiencing chronic hives since July 2023, he says he has been to his rheumatologist, PCP, and 3 Dermatologist in the past months. In December 2022 he had his first shingles vaccine dose in April 2023, he had the second shingles vaccine, he says he felt fine did not have any issues, on June 14, 2023, he was given allopurinol for his gout by his rheumatologist, he was taking it once a day daily, in July 25, 2023 he woke up with hives all over his back, he followed up with a dermatologist and was told diagnosed with shingles, he then went to his PCP and was told it was shingles once again and was given medication for shingles, he says he took it but it did not help relief his symptoms, he still had hives in September 2023, he followed up with another Rheumatologist, he suspected these hives were an allergic reaction to medication, his rheumatologist suggested to stop taking allopurinol to see if hives went away but it did not. He went to another rheumatologist in [place], he was prescribed Benadryl and Loratadine, but it did not relief his symptoms. He saw another dermatologist and was prescribed Benadryl once again, but it did not help. He saw an allergist in October, he had food allergy testing done everything came out negative, he asked if he had to come back for environmental testing but was told no since the hives are consistent. He went back to his dermatologist in [place] he was prescribed DOXEPIN 10 mg he says this medication helped get rid of his hives back in February, he had to slowly lean down on the medication, he was told after 30 days he had to take it every other day, in March his hives came back, he says he had hives until July 4, 2024, his rheumatologist prescribed him Prednisone he says he has not had hives since, he is currently still taking DOXEPIN but it concerned about future side effects. He has asthma, he takes albuterol as needed, it only flares up in winter. He is here today to know how to better treat his symptoms.

## 2024-07-31 NOTE — SOCIAL HISTORY
[House] : [unfilled] lives in a house  [Central Forced Air] : heating provided by central forced air [Central] : air conditioning provided by central unit [Dry] : dry [Dust Mite Covers] : has dust mite covers [Dog] : dog [Humidifier] : does not use a humidifier [Dehumidifier] : does not use a dehumidifier [Cockroaches] : Patient states that there are no cockroaches in the home [Feather Pillows] : does not have feather pillows [Feather Comforter] : does not have a feather comforter [Bedroom] : not in the bedroom [Basement] : not in the basement [Living Area] : not in the living area [Smokers in Household] : there are no smokers in the home [de-identified] : carpet on steps  [de-identified] : n/a

## 2024-09-06 ENCOUNTER — APPOINTMENT (OUTPATIENT)
Dept: PEDIATRIC ALLERGY IMMUNOLOGY | Facility: CLINIC | Age: 54
End: 2024-09-06
Payer: MEDICARE

## 2024-09-06 VITALS
HEIGHT: 69 IN | SYSTOLIC BLOOD PRESSURE: 120 MMHG | WEIGHT: 184 LBS | BODY MASS INDEX: 27.25 KG/M2 | DIASTOLIC BLOOD PRESSURE: 80 MMHG

## 2024-09-06 DIAGNOSIS — L50.8 OTHER URTICARIA: ICD-10-CM

## 2024-09-06 PROCEDURE — 99214 OFFICE O/P EST MOD 30 MIN: CPT

## 2024-09-06 NOTE — HISTORY OF PRESENT ILLNESS
[de-identified] : UVALDO GROSS is a 53 year old male who has been experiencing chronic hives since July 2023, he says he has been to his rheumatologist, PCP, and 3 Dermatologist in the past months. In December 2022 he had his first shingles vaccine dose in April 2023, he had the second shingles vaccine, he says he felt fine did not have any issues, on June 14, 2023, he was given allopurinol for his gout by his rheumatologist, he was taking it once a day daily, in July 25, 2023 he woke up with hives all over his back, he followed up with a dermatologist and was told diagnosed with shingles, he then went to his PCP and was told it was shingles once again and was given medication for shingles, he says he took it but it did not help relief his symptoms, he still had hives in September 2023, he followed up with another Rheumatologist, he suspected these hives were an allergic reaction to medication, his rheumatologist suggested to stop taking allopurinol to see if hives went away but it did not. He went to another rheumatologist in [place], he was prescribed Benadryl and Loratadine, but it did not relief his symptoms. He saw another dermatologist and was prescribed Benadryl once again, but it did not help. He saw an allergist in October, he had food allergy testing done everything came out negative, he asked if he had to come back for environmental testing but was told no since the hives are consistent. He went back to his dermatologist in [place] he was prescribed DOXEPIN 10 mg he says this medication helped get rid of his hives back in February, he had to slowly lean down on the medication, he was told after 30 days he had to take it every other day, in March his hives came back, he says he had hives until July 4, 2024, his rheumatologist prescribed him Prednisone he says he has not had hives since, he is currently still taking DOXEPIN but it concerned about future side effects. He has asthma, he takes albuterol as needed, it only flares up in winter. He is here today to know how to better treat his symptoms.    He is here today for a follow up, he says the first 10 days his hives were flaring up on and off, he noticed that after August 20, 2024 his hives stopped flaring up, he says this is the same time his hives started a year ago, he wants to know if this is due to the medication working or if this will happen to him every year around this time.

## 2024-10-25 ENCOUNTER — APPOINTMENT (OUTPATIENT)
Dept: PEDIATRIC ALLERGY IMMUNOLOGY | Facility: CLINIC | Age: 54
End: 2024-10-25
Payer: MEDICARE

## 2024-10-25 VITALS
SYSTOLIC BLOOD PRESSURE: 130 MMHG | HEIGHT: 69 IN | DIASTOLIC BLOOD PRESSURE: 80 MMHG | WEIGHT: 170 LBS | BODY MASS INDEX: 25.18 KG/M2

## 2024-10-25 DIAGNOSIS — L50.8 OTHER URTICARIA: ICD-10-CM

## 2024-10-25 PROCEDURE — 99214 OFFICE O/P EST MOD 30 MIN: CPT

## 2024-10-25 RX ORDER — CETIRIZINE HYDROCHLORIDE 10 MG/1
10 TABLET, COATED ORAL DAILY
Qty: 90 | Refills: 1 | Status: ACTIVE | COMMUNITY
Start: 2024-10-25 | End: 1900-01-01

## 2024-10-25 RX ORDER — FAMOTIDINE 20 MG/1
20 TABLET, FILM COATED ORAL
Qty: 180 | Refills: 1 | Status: ACTIVE | COMMUNITY
Start: 2024-10-25 | End: 1900-01-01

## 2024-11-14 ENCOUNTER — APPOINTMENT (OUTPATIENT)
Dept: ORTHOPEDIC SURGERY | Facility: CLINIC | Age: 54
End: 2024-11-14
Payer: MEDICARE

## 2024-11-14 DIAGNOSIS — M54.12 RADICULOPATHY, CERVICAL REGION: ICD-10-CM

## 2024-11-14 DIAGNOSIS — M54.16 RADICULOPATHY, LUMBAR REGION: ICD-10-CM

## 2024-11-14 DIAGNOSIS — M75.42 IMPINGEMENT SYNDROME OF LEFT SHOULDER: ICD-10-CM

## 2024-11-14 DIAGNOSIS — M51.9 UNSPECIFIED THORACIC, THORACOLUMBAR AND LUMBOSACRAL INTERVERTEBRAL DISC DISORDER: ICD-10-CM

## 2024-11-14 DIAGNOSIS — M75.41 IMPINGEMENT SYNDROME OF RIGHT SHOULDER: ICD-10-CM

## 2024-11-14 DIAGNOSIS — M50.90 CERVICAL DISC DISORDER, UNSPECIFIED, UNSPECIFIED CERVICAL REGION: ICD-10-CM

## 2024-11-14 PROCEDURE — 99213 OFFICE O/P EST LOW 20 MIN: CPT

## 2024-11-14 PROCEDURE — G2211 COMPLEX E/M VISIT ADD ON: CPT

## 2025-01-24 ENCOUNTER — APPOINTMENT (OUTPATIENT)
Dept: PEDIATRIC ALLERGY IMMUNOLOGY | Facility: CLINIC | Age: 55
End: 2025-01-24
Payer: MEDICARE

## 2025-01-24 ENCOUNTER — NON-APPOINTMENT (OUTPATIENT)
Age: 55
End: 2025-01-24

## 2025-01-24 VITALS
HEIGHT: 69 IN | SYSTOLIC BLOOD PRESSURE: 105 MMHG | WEIGHT: 170 LBS | DIASTOLIC BLOOD PRESSURE: 60 MMHG | BODY MASS INDEX: 25.18 KG/M2

## 2025-01-24 DIAGNOSIS — L50.8 OTHER URTICARIA: ICD-10-CM

## 2025-01-24 PROCEDURE — 99212 OFFICE O/P EST SF 10 MIN: CPT

## 2025-03-26 ENCOUNTER — APPOINTMENT (OUTPATIENT)
Dept: ORTHOPEDIC SURGERY | Facility: CLINIC | Age: 55
End: 2025-03-26
Payer: MEDICARE

## 2025-03-26 DIAGNOSIS — M75.42 IMPINGEMENT SYNDROME OF LEFT SHOULDER: ICD-10-CM

## 2025-03-26 DIAGNOSIS — M75.41 IMPINGEMENT SYNDROME OF RIGHT SHOULDER: ICD-10-CM

## 2025-03-26 DIAGNOSIS — M51.9 UNSPECIFIED THORACIC, THORACOLUMBAR AND LUMBOSACRAL INTERVERTEBRAL DISC DISORDER: ICD-10-CM

## 2025-03-26 DIAGNOSIS — M54.16 RADICULOPATHY, LUMBAR REGION: ICD-10-CM

## 2025-03-26 DIAGNOSIS — M54.12 RADICULOPATHY, CERVICAL REGION: ICD-10-CM

## 2025-03-26 DIAGNOSIS — M50.90 CERVICAL DISC DISORDER, UNSPECIFIED, UNSPECIFIED CERVICAL REGION: ICD-10-CM

## 2025-03-26 PROCEDURE — G2211 COMPLEX E/M VISIT ADD ON: CPT

## 2025-03-26 PROCEDURE — 99213 OFFICE O/P EST LOW 20 MIN: CPT

## 2025-06-30 ENCOUNTER — APPOINTMENT (OUTPATIENT)
Dept: UROLOGY | Facility: CLINIC | Age: 55
End: 2025-06-30
Payer: MEDICARE

## 2025-06-30 VITALS
SYSTOLIC BLOOD PRESSURE: 146 MMHG | HEIGHT: 69 IN | OXYGEN SATURATION: 98 % | WEIGHT: 170 LBS | DIASTOLIC BLOOD PRESSURE: 80 MMHG | HEART RATE: 60 BPM | BODY MASS INDEX: 25.18 KG/M2

## 2025-06-30 LAB
BILIRUB UR QL STRIP: NORMAL
COLLECTION METHOD: NORMAL
GLUCOSE UR-MCNC: NORMAL
HCG UR QL: 0.2 EU/DL
HGB UR QL STRIP.AUTO: NORMAL
KETONES UR-MCNC: NORMAL
LEUKOCYTE ESTERASE UR QL STRIP: NORMAL
NITRITE UR QL STRIP: NORMAL
PH UR STRIP: 6
PROT UR STRIP-MCNC: NORMAL
SP GR UR STRIP: 1.01

## 2025-06-30 PROCEDURE — 99214 OFFICE O/P EST MOD 30 MIN: CPT

## 2025-07-07 LAB — BACTERIA UR CULT: NORMAL

## 2025-07-16 ENCOUNTER — APPOINTMENT (OUTPATIENT)
Dept: ORTHOPEDIC SURGERY | Facility: CLINIC | Age: 55
End: 2025-07-16
Payer: MEDICARE

## 2025-07-16 PROBLEM — M76.72 PERONEAL TENDINITIS OF LEFT LOWER LEG: Status: ACTIVE | Noted: 2025-07-16

## 2025-07-16 PROCEDURE — G2211 COMPLEX E/M VISIT ADD ON: CPT

## 2025-07-16 PROCEDURE — 73630 X-RAY EXAM OF FOOT: CPT | Mod: LT

## 2025-07-16 PROCEDURE — 99213 OFFICE O/P EST LOW 20 MIN: CPT

## 2025-08-04 ENCOUNTER — RX RENEWAL (OUTPATIENT)
Age: 55
End: 2025-08-04

## 2025-08-04 ENCOUNTER — TRANSCRIPTION ENCOUNTER (OUTPATIENT)
Age: 55
End: 2025-08-04

## 2025-08-05 ENCOUNTER — TRANSCRIPTION ENCOUNTER (OUTPATIENT)
Age: 55
End: 2025-08-05

## 2025-08-06 ENCOUNTER — TRANSCRIPTION ENCOUNTER (OUTPATIENT)
Age: 55
End: 2025-08-06

## 2025-08-06 RX ORDER — TADALAFIL 5 MG/1
5 TABLET ORAL
Qty: 90 | Refills: 3 | Status: ACTIVE | COMMUNITY
Start: 2025-08-06 | End: 1900-01-01

## 2025-08-07 ENCOUNTER — TRANSCRIPTION ENCOUNTER (OUTPATIENT)
Age: 55
End: 2025-08-07

## 2025-08-11 ENCOUNTER — TRANSCRIPTION ENCOUNTER (OUTPATIENT)
Age: 55
End: 2025-08-11

## 2025-08-12 ENCOUNTER — TRANSCRIPTION ENCOUNTER (OUTPATIENT)
Age: 55
End: 2025-08-12